# Patient Record
Sex: FEMALE | Race: WHITE
[De-identification: names, ages, dates, MRNs, and addresses within clinical notes are randomized per-mention and may not be internally consistent; named-entity substitution may affect disease eponyms.]

---

## 2018-02-10 ENCOUNTER — HOSPITAL ENCOUNTER (EMERGENCY)
Dept: HOSPITAL 68 - ERH | Age: 38
End: 2018-02-10
Payer: COMMERCIAL

## 2018-02-10 VITALS — DIASTOLIC BLOOD PRESSURE: 77 MMHG | SYSTOLIC BLOOD PRESSURE: 119 MMHG

## 2018-02-10 DIAGNOSIS — N76.0: ICD-10-CM

## 2018-02-10 DIAGNOSIS — N39.0: Primary | ICD-10-CM

## 2018-02-10 PROCEDURE — 87070 CULTURE OTHR SPECIMN AEROBIC: CPT

## 2018-02-10 NOTE — ED GI/GU/ABDOMINAL COMPLAINT
History of Present Illness
 
General
Chief Complaint: Female Urogenital Problems
Stated Complaint: "I BEEN HAVING BURNING SENSATION DOWN THERE"
Source: patient
Exam Limitations: no limitations
 
Vital Signs & Intake/Output
Vital Signs & Intake/Output
 Vital Signs
 
 
Date Time Temp Pulse Resp B/P B/P Pulse O2 O2 Flow FiO2
 
     Mean Ox Delivery Rate 
 
02/10 2220 97.2 94 20 119/77  99   
 
 
 ED Intake and Output
 
 
 02/11 0000 02/10 1200
 
Intake Total 0 
 
Output Total  
 
Balance 0 
 
   
 
Intake, Oral 0 
 
 
 
Allergies
Coded Allergies:
No Known Allergies (02/10/18)
 
Reconcile Medications
Ciprofloxacin HCl (Cipro) 500 MG TABLET   1 TAB PO BID UTI
Phenazopyridine HCl (Pyridium) 100 MG TABLET   1 TAB PO TID DYSURIA
     NOTE THAT URINE MAY CHANGE ORANGE
 
Triage Note:
PER PT FEELS LIKE HAS TO PEE BUT THEN DOESN'T
OCCURS SINCE FRIDAY LMP 2/5/18
Triage Nurses Notes Reviewed? yes
Pregnant? N
Is pt currently breastfeeding? No
Onset: Gradual
Duration: constant
Timing: recent history
Severity Numbers: 5
Location: vaginal
Radiation: no radiation
HPI:
Patient is a 37-year-old female with an unremarkable past medical history who 
presents emergency room with a two-day history of vaginal irritation and burning
sensation patient states that she is sexually active with her  and has no
concerns of STD patient denies any vaginal bleeding discharge dysuria hematuria 
abdominal pain nausea vomiting active pain fever or chills.
The patient states that she's had UTIs in the past and feels different.  No 
recent antibiotics
Patient tried vaginal cream and Monistat with no improvement of symptoms
(Phill Mcrae)
 
Past History
 
Travel History
Traveled to [place] past 21 day No
 
Medical History
Any Pertinent Medical History? none
Neurological: NONE
EENT: NONE
Cardiovascular: NONE
Respiratory: NONE
Gastrointestinal: NONE
Hepatic: NONE
Renal: NONE
Musculoskeletal: NONE
Psychiatric: NONE
Endocrine: NONE
 
Surgical History
Surgical History: non-contributory
 
Psychosocial History
What is your primary language English
Tobacco Use: Never used
 
Family History
Hx Contributory? No
(Phill Mcrae)
 
Review of Systems
 
Review of Systems
Constitutional:
Reports: no symptoms. 
EENTM:
Reports: no symptoms. 
Respiratory:
Reports: no symptoms. 
Cardiovascular:
Reports: no symptoms. 
GI:
Reports: no symptoms. 
Genitourinary:
Reports: see HPI. 
Musculoskeletal:
Reports: no symptoms. 
Skin:
Reports: no symptoms. 
Neurological/Psychological:
Reports: no symptoms. 
Hematologic/Endocrine:
Reports: no symptoms. 
Immunologic/Allergic:
Reports: no symptoms. 
All Other Systems: Reviewed and Negative
(Phill Mcrae)
 
Physical Exam
 
Physical Exam
General Appearance: no apparent distress, alert, comfortable
Head: atraumatic
Eyes:
Bilateral: normal appearance. 
Ears, Nose, Throat, Mouth: moist mucous membrane
Neck: normal inspection
Respiratory: no respiratory distress
Gastrointestinal: normal bowel sounds, soft, non-tender
Pelvic: normal external exam, normal bimanual exam, no cerv. motion tender, no 
masses, NO CERVICAL MOTION TENDERNESS MILD WHITE DISCHARGE OF CERVIX
Extremities: normal range of motion
Neurologic/Psych: no motor/sensory deficits, awake
Skin: intact, normal color, warm/dry
 
Core Measures
ACS in differential dx? No
Sepsis Present: No
Sepsis Focused Exam Completed? No
(Phill Mcrae)
 
Progress
Differential Diagnosis: appendicitis, ectopic pregnancy, endometritis, ovarian 
cyst, ovarian torsion, pancreatitis, PID/cervicitis, peptic ulcer, PUD/GERD, 
perforated viscous, SBO, UTI/pyelo
Plan of Care:
 Orders
 
 
Procedure Date/time Status
 
TRICHOMONAS 02/10 2259 Complete
 
POTASSIUM HYDROXIDE (KOH) 02/10 2259 Complete
 
GENITAL CULTURE 02/10 2259 Active
 
CHLAMYDIA-GC DNA PROBE 02/10 2259 Active
 
CULTURE,URINE 02/10 2217 Active
 
URINE PREGNANCY 02/10 2217 Complete
 
URINALYSIS 02/10 2217 Complete
 
 
 Laboratory Tests
 
 
02/10/18 2225:
Urine Color YEL, Urine Clarity HAZY  H, Urine pH 6.0, Ur Specific Gravity >= 
1.030, Urine Protein 30  H, Urine Ketones NEG, Urine Nitrite NEG, Urine 
Bilirubin NEG, Urine Urobilinogen 0.2, Ur Leukocyte Esterase TRACE  H, Ur 
Microscopic SEDIMENT EXAMINED, Urine RBC 1-3, Urine WBC 15-25  H, Ur Epithelial 
Cells MANY  H, Urine Bacteria MOD  H, Urine Mucus FEW, Urine Hemoglobin SMALL  H
, Urine Glucose NEG, Urine Pregnancy Test NEGATIVE
 Microbiology
02/10 2301  GENITAL: GC DNA Probe - RECD
02/10 2301  GENITAL: Chlamydia DNA Probe ([facility]) - RECD
02/10 2301  GENITAL: LAWRENCE Preparation - COMP
02/10 2301  GENITAL: Trichomonas Preparation - COMP
02/10 2301  GENITAL: Genital Culture - RECD
02/10 2225  URINE ROUT: Urine Culture - RECD
 
Patient on initial examination was in no apparent distress resting comfortably 
at bedside and has unremarkable abdominal exam
 
Discussed with patient intently and which is negative KOH and Trichomonas was no
however cultures are pending of urine and cervix
 
 
I discussed with patient to call the emergency room in 2 days for culture 
results
 
 
Initial ED EKG: normal axis, none
(Phill Mcrae)
 
Departure
 
Departure
Disposition: HOME OR SELF CARE
Condition: Stable
Clinical Impression
Primary Impression: UTI (urinary tract infection)
Secondary Impressions: Vaginitis
Referrals:
Lia CHEN,Yanet SEAMAN (PCP/Family)
 
Additional Instructions:
As discussed begin the prescription of ciprofloxacin as directed for the full 
course and BEGIN the prescription of Pyridium for your symptoms, please note 
that this medication may change the color of YOUR urine orange.  Prescriptions 
awaiting [facility] [place].  Follow-up and CALL the emergency room in 2 days for 
results of your cultures.  A symptoms worsen or IF YOU develop new concerning 
symptom return to emergency room.
Follow-up with your OB/GYN on Monday.
Departure Forms:
Customer Survey
General Discharge Information
Prescriptions:
Current Visit Scripts
Ciprofloxacin HCl (Cipro) 1 TAB PO BID  
     #14 TAB 
 
Phenazopyridine HCl (Pyridium) 1 TAB PO TID  
     #6 TAB 
     NOTE THAT URINE MAY CHANGE ORANGE
 
 
(Phill Mcrae)
 
PA/NP Co-Sign Statement
Statement:
ED Attending supervision documentation-
 
 I saw and evaluated the patient. I have also reviewed all the pertinent lab 
results and diagnostic results. I agree with the findings and the plan of care 
as documented in the PA's/NP's documentation. 
 
x I have reviewed the ED Record and agree with the PA's/NP's documentation.
 
[] Additions or exceptions (if any) to the PAs/NP's note and plan are 
summarized below:
[]
 
(Scott CHEN,[place]un[place])